# Patient Record
Sex: MALE | Race: WHITE | NOT HISPANIC OR LATINO | Employment: FULL TIME | URBAN - METROPOLITAN AREA
[De-identification: names, ages, dates, MRNs, and addresses within clinical notes are randomized per-mention and may not be internally consistent; named-entity substitution may affect disease eponyms.]

---

## 2023-05-30 ENCOUNTER — APPOINTMENT (OUTPATIENT)
Dept: RADIOLOGY | Facility: CLINIC | Age: 48
End: 2023-05-30

## 2023-05-30 VITALS
WEIGHT: 180.2 LBS | DIASTOLIC BLOOD PRESSURE: 89 MMHG | BODY MASS INDEX: 25.8 KG/M2 | SYSTOLIC BLOOD PRESSURE: 149 MMHG | HEIGHT: 70 IN | HEART RATE: 94 BPM

## 2023-05-30 DIAGNOSIS — M25.562 LEFT KNEE PAIN, UNSPECIFIED CHRONICITY: ICD-10-CM

## 2023-05-30 DIAGNOSIS — M25.561 RIGHT KNEE PAIN, UNSPECIFIED CHRONICITY: ICD-10-CM

## 2023-05-30 DIAGNOSIS — S86.112A STRAIN OF GASTROCNEMIUS MUSCLE OF LEFT LOWER EXTREMITY, INITIAL ENCOUNTER: Primary | ICD-10-CM

## 2023-05-30 RX ORDER — EFAVIRENZ, EMTRICITABINE AND TENOFOVIR DISOPROXIL FUMARATE 600; 200; 300 MG/1; MG/1; MG/1
1 TABLET, FILM COATED ORAL EVERY EVENING
COMMUNITY

## 2023-05-30 RX ORDER — ATORVASTATIN CALCIUM 40 MG/1
TABLET, FILM COATED ORAL
COMMUNITY
Start: 2023-03-12

## 2023-05-30 RX ORDER — PANTOPRAZOLE SODIUM 40 MG/1
TABLET, DELAYED RELEASE ORAL
COMMUNITY
Start: 2023-02-24

## 2023-05-30 NOTE — PROGRESS NOTES
Ortho Sports Medicine New Patient Visit     Assesment:   50 y o  male with left gastroc strain and mild knee osteoarthritis    Plan: We would a long discussion regarding left knee injury and mild degenerative joint disease including options for treatment  I recommended starting with conservative management options  We discussed physical therapy to focus on stretching and strengthening of the acute injury as well as his core hip and quad strength  He prefers to continue self-directed exercises  He may continue his walking exercises as pain allows  He does have mild osteoarthritic changes on x-ray with subchondral sclerosis but well-maintained joint spaces  This may contribute to his pain to a small degree  We will continue to monitor this at this time  We will have options for additional treatment including injections in the future if needed  Otherwise I recommended physical therapy for this as well  I will see him back as needed in the future  Chief Complaint   Patient presents with   • Left Knee - Pain       History of Present Illness: The patient is a 50 y o  male presenting with pain in the left knee for roughly the last 3 weeks  He does feel like he had a small muscle strain during a walk at that time  He also has had pain since then its been achy he has had similar pain in the past but usually goes away after a few days  he states that he does like power walking several times per week  After one of his recent walks he noticed achy pain deep within the knee as well as in the anterior knee that has not improved  He did not notice any swelling  He does not notice any locking or mechanical symptoms or instability  The pain is worse after period of rest and it does loosen up and improved with motion and activity  He has not had any specific treatment for this issue yet  He denies any numbness or tingling          Knee Surgical History:  None    Past Medical, Social and Family History:  Past Medical History:   Diagnosis Date   • High cholesterol    • HIV (human immunodeficiency virus infection) ([facility] [address] )      Past Surgical History:   Procedure Laterality Date   • CYST REMOVAL       No Known Allergies  Current Outpatient Medications on File Prior to Visit   Medication Sig Dispense Refill   • atorvastatin (LIPITOR) 40 mg tablet      • Efavirenz-Emtricitab-Tenofo DF (ATRIPLA) 600-200-300 mg per tablet Take 1 tablet by mouth every evening     • pantoprazole (PROTONIX) 40 mg tablet        No current facility-administered medications on file prior to visit  Social History     Socioeconomic History   • Marital status: Single     Spouse name: Not on file   • Number of children: Not on file   • Years of education: Not on file   • Highest education level: Not on file   Occupational History   • Not on file   Tobacco Use   • Smoking status: Every Day     Types: Cigarettes   • Smokeless tobacco: Never   Vaping Use   • Vaping Use: Some days   Substance and Sexual Activity   • Alcohol use: Yes   • Drug use: Never   • Sexual activity: Not on file   Other Topics Concern   • Not on file   Social History Narrative   • Not on file     Social Determinants of Health     Financial Resource Strain: Not on file   Food Insecurity: Not on file   Transportation Needs: Not on file   Physical Activity: Not on file   Stress: Not on file   Social Connections: Not on file   Intimate Partner Violence: Not on file   Housing Stability: Not on file         I have reviewed the past medical, surgical, social and family history, medications and allergies as documented in the EMR  Review of systems: ROS is negative other than that noted in the HPI  Constitutional: Negative for fatigue and fever  HENT: Negative for sore throat  Respiratory: Negative for shortness of breath  Cardiovascular: Negative for chest pain  Gastrointestinal: Negative for abdominal pain     Endocrine: Negative for cold intolerance and heat "intolerance  Genitourinary: Negative for flank pain  Musculoskeletal: Negative for back pain  Skin: Negative for rash  Allergic/Immunologic: Negative for immunocompromised state  Neurological: Negative for dizziness  Psychiatric/Behavioral: Negative for agitation  Physical Exam:    Blood pressure 149/89, pulse 94, height 5' 10 08\" (1 78 m), weight 81 7 kg (180 lb 3 2 oz)  General/Constitutional: NAD, well developed, well nourished  HENT: Normocephalic, atraumatic  CV: Intact distal pulses, regular rate  Resp: No respiratory distress or labored breathing  Lymphatic: No lymphadenopathy palpated  Neuro: Alert and Oriented x 3, no focal deficits  Psych: Normal mood, normal affect  Skin: Warm, dry, no rashes, no erythema      Knee Exam:   No significant skin lesions or deformity  Range of motion from 0 to 130  Mild posterior pain with full extension and with foot dorsiflexion limited patellar mobility  No defect along the calf musculature or Achilles tendon  no medial or lateral joint line tenderness Knee is stable to varus stress, valgus stress, Lachman, and posterior drawer  There is no dynamic valgus on step-down testing  Neurovascularly intact distally      Knee Imaging    X-rays of the knee reviewed and interpreted today  These show no acute fractures  Mild degenerative changes including subchondral sclerosis  Well-maintained joint spaces  patella is well centered on the trochlea      "